# Patient Record
Sex: MALE | Race: OTHER | HISPANIC OR LATINO
[De-identification: names, ages, dates, MRNs, and addresses within clinical notes are randomized per-mention and may not be internally consistent; named-entity substitution may affect disease eponyms.]

---

## 2018-10-02 PROBLEM — Z00.00 ENCOUNTER FOR PREVENTIVE HEALTH EXAMINATION: Status: ACTIVE | Noted: 2018-10-02

## 2018-10-16 ENCOUNTER — APPOINTMENT (OUTPATIENT)
Dept: HEART AND VASCULAR | Facility: CLINIC | Age: 43
End: 2018-10-16
Payer: SELF-PAY

## 2018-10-16 ENCOUNTER — NON-APPOINTMENT (OUTPATIENT)
Age: 43
End: 2018-10-16

## 2018-10-16 VITALS
RESPIRATION RATE: 14 BRPM | SYSTOLIC BLOOD PRESSURE: 130 MMHG | WEIGHT: 274 LBS | DIASTOLIC BLOOD PRESSURE: 84 MMHG | HEART RATE: 76 BPM | HEIGHT: 65 IN | BODY MASS INDEX: 45.65 KG/M2

## 2018-10-16 DIAGNOSIS — Z87.898 PERSONAL HISTORY OF OTHER SPECIFIED CONDITIONS: ICD-10-CM

## 2018-10-16 DIAGNOSIS — Z87.828 PERSONAL HISTORY OF OTHER (HEALED) PHYSICAL INJURY AND TRAUMA: ICD-10-CM

## 2018-10-16 DIAGNOSIS — Z86.79 PERSONAL HISTORY OF OTHER DISEASES OF THE CIRCULATORY SYSTEM: ICD-10-CM

## 2018-10-16 PROCEDURE — 93000 ELECTROCARDIOGRAM COMPLETE: CPT

## 2018-10-16 PROCEDURE — 99203 OFFICE O/P NEW LOW 30 MIN: CPT

## 2018-11-06 ENCOUNTER — APPOINTMENT (OUTPATIENT)
Dept: HEART AND VASCULAR | Facility: CLINIC | Age: 43
End: 2018-11-06
Payer: SELF-PAY

## 2018-11-06 VITALS
WEIGHT: 274 LBS | DIASTOLIC BLOOD PRESSURE: 76 MMHG | BODY MASS INDEX: 45.65 KG/M2 | SYSTOLIC BLOOD PRESSURE: 142 MMHG | HEIGHT: 65 IN | HEART RATE: 76 BPM

## 2018-11-06 PROCEDURE — 93320 DOPPLER ECHO COMPLETE: CPT

## 2018-11-06 PROCEDURE — 93351 STRESS TTE COMPLETE: CPT

## 2018-11-06 PROCEDURE — 93325 DOPPLER ECHO COLOR FLOW MAPG: CPT

## 2018-11-07 ENCOUNTER — RESULT REVIEW (OUTPATIENT)
Age: 43
End: 2018-11-07

## 2018-11-13 ENCOUNTER — APPOINTMENT (OUTPATIENT)
Dept: HEART AND VASCULAR | Facility: CLINIC | Age: 43
End: 2018-11-13
Payer: SELF-PAY

## 2018-11-13 VITALS
BODY MASS INDEX: 45.15 KG/M2 | WEIGHT: 271 LBS | DIASTOLIC BLOOD PRESSURE: 72 MMHG | HEART RATE: 68 BPM | HEIGHT: 65 IN | RESPIRATION RATE: 16 BRPM | SYSTOLIC BLOOD PRESSURE: 110 MMHG

## 2018-11-13 PROCEDURE — 99213 OFFICE O/P EST LOW 20 MIN: CPT

## 2018-11-13 NOTE — DISCUSSION/SUMMARY
[Unlikely Cardiac Ischemia (Low Prob.)] : chest pain unlikely to represent cardiac ischemia (low probability) [Hypertriglyceridemia] : essential hypertriglyceridemia [Diet Modification] : diet modification [Exercise] : exercise [Weight Loss] : weight loss [___ Month(s)] : [unfilled] month(s) [de-identified] : resolved [de-identified] : ; A1C 5.7 [FreeTextEntry4] : Encouraged to increase on water intake

## 2018-11-13 NOTE — REASON FOR VISIT
[Initial Evaluation] : an initial evaluation of [Chest Pain] : chest pain [FreeTextEntry1] : 42 year old M with no significant PMH here for followup after recent stress test. Chest pain has resolved. Still with occasional headaches. Drinks 24 ounces of water daily. No dyspnea, orthopnea, PND, edema. \par \par EXSE Jorge protocol: 9:35 min; No EKG changes; No WMA on post stress images. \par Resting echo\par \par EKG 10/16/2018  NSR at 81 bpm with nonspecific T wave abnormality in lead II. \par \par  Line used: Nita ID [id number]

## 2018-11-13 NOTE — PHYSICAL EXAM
[General Appearance - Well Developed] : well developed [Normal Appearance] : normal appearance [Well Groomed] : well groomed [General Appearance - Well Nourished] : well nourished [No Deformities] : no deformities [General Appearance - In No Acute Distress] : no acute distress [Normal Oral Mucosa] : normal oral mucosa [No Oral Pallor] : no oral pallor [No Oral Cyanosis] : no oral cyanosis [Respiration, Rhythm And Depth] : normal respiratory rhythm and effort [Exaggerated Use Of Accessory Muscles For Inspiration] : no accessory muscle use [Auscultation Breath Sounds / Voice Sounds] : lungs were clear to auscultation bilaterally [Heart Rate And Rhythm] : heart rate and rhythm were normal [Heart Sounds] : normal S1 and S2 [Murmurs] : no murmurs present [Abdomen Soft] : soft [Abdomen Tenderness] : non-tender [Abdomen Mass (___ Cm)] : no abdominal mass palpated [Nail Clubbing] : no clubbing of the fingernails [Cyanosis, Localized] : no localized cyanosis [Petechial Hemorrhages (___cm)] : no petechial hemorrhages [] : no ischemic changes [Oriented To Time, Place, And Person] : oriented to person, place, and time [Affect] : the affect was normal [Mood] : the mood was normal [No Anxiety] : not feeling anxious

## 2019-05-14 ENCOUNTER — APPOINTMENT (OUTPATIENT)
Dept: HEART AND VASCULAR | Facility: CLINIC | Age: 44
End: 2019-05-14